# Patient Record
Sex: MALE | Race: WHITE
[De-identification: names, ages, dates, MRNs, and addresses within clinical notes are randomized per-mention and may not be internally consistent; named-entity substitution may affect disease eponyms.]

---

## 2018-08-10 ENCOUNTER — HOSPITAL ENCOUNTER (EMERGENCY)
Dept: HOSPITAL 59 - ER | Age: 23
Discharge: HOME | End: 2018-08-10
Payer: COMMERCIAL

## 2018-08-10 DIAGNOSIS — R06.00: ICD-10-CM

## 2018-08-10 DIAGNOSIS — N50.9: ICD-10-CM

## 2018-08-10 DIAGNOSIS — F41.1: Primary | ICD-10-CM

## 2018-08-10 LAB
BARBITURATE SCREEN URINE: NOT DETECTED
BENZODIAZEPINE SCREEN URINE: NOT DETECTED
CARDIOLIPIN IGM SER IA-ACNC: NOT DETECTED
CARDIOLIPIN IGM SER IA-ACNC: NOT DETECTED
METHADONE SCREEN URINE: NOT DETECTED
OPIATE SCREEN URINE: NOT DETECTED
PF4 HEPARIN CMPLX AB SER-ACNC: NOT DETECTED
PF4 HEPARIN CMPLX AB SER-ACNC: NOT DETECTED
PHENCYCLIDINE SCREEN URINE: NOT DETECTED
PROPOXYPHENE SCREEN URINE: NOT DETECTED
THC SCREEN URINE: NOT DETECTED
TRICYCLIC ANTIDEPRESSANT SCRN: NOT DETECTED

## 2018-08-10 PROCEDURE — 99283 EMERGENCY DEPT VISIT LOW MDM: CPT

## 2018-08-10 PROCEDURE — 80305 DRUG TEST PRSMV DIR OPT OBS: CPT

## 2018-08-10 NOTE — EMERGENCY DEPARTMENT RECORD
Anxiety





- General


Chief Complaint: Anxiety


Stated Complaint: ANXIETY


Time Seen by Provider: 08/10/18 16:42


Source: Patient


Mode of Arrival: Ambulatory


Limitations: No limitations





- History of Present Illness


Initial Comments: 





Pt from work with "anxiety" and hx of the same "twice a week or so".  Pt felt 

odd at work and "tried to calm down".  [place] heart racign and started to breath 

fast.  Tingles to lips and fingers.  No HA.  No Weakness.  Now feeling better 

on arrival. Never seen by a physician for these attacks.  Always able to 

resolve without medication. 


MD Complaint: Anxiety


Onset/Timin


-: Hour(s)


Symptoms: Dyspnea, Extremity numbness/tingling, Palpitations, Periorial numbness

/tingling


Place: Work


Previous History of Same: Yes


Severity: Moderate


Quality: Intermittant


Provoking factors: None known


Improves With: Rest


Worsens With: Nothing


Associated symptoms: Denies other symptoms





- Related Data


Home Medications: 


 Home Medications











 Medication  Instructions  Recorded  Confirmed  Last Taken


 


No Home Med [NO HOME MEDS]  08/10/18 08/10/18 Unknown











Allergies/Adverse Reactions: 


 Allergies











Allergy/AdvReac Type Severity Reaction Status Date / Time


 


No Known Drug Allergies Allergy   Verified 08/10/18 16:44














Review of Systems


Constitutional: Denies: Chills, Fever, Weakness


Eyes: Denies: Eye pain, Vision change


ENT: Denies: Congestion, Dental pain


Respiratory: Denies: Cough, Dyspnea


Cardiovascular: Reports: Palpitations.  Denies: Chest pain


Endocrine: Denies: Fatigue


Gastrointestinal: Reports: Nausea.  Denies: Abdominal pain, Vomiting


Musculoskeletal: Denies: Back pain, Joint swelling


Skin: Denies: Rash


Neurological: Reports: Tingling.  Denies: Abnormal gait, Headache, Seizure, 

Tremors, Weakness


Psychiatric: Reports: Anxiety.  Denies: Auditory hallucinations, Suicidal 

thoughts, Visual hallucinations





Physical Exam





- General


General Appearance: Alert, Oriented x3, Cooperative, Mild distress





- Eye


Eye exam: PERRL, EOMI.  negative: Nystagmus





- ENT


ENT exam: Mucous membranes moist, Normal external ear exam, Normal orophraynx, 

TM's normal bilaterally





- Neck


Neck exam: Normal inspection.  negative: Tenderness, Thyromegaly





- Respiratory


Respiratory exam: Normal lung sounds bilaterally.  negative: Rhonchi, Wheezes





- Cardiovascular


Cardiovascular Exam: Regular rate, Normal rhythm.  negative: Diastolic murmur, 

Irregular rhythm, Systolic murmur





- GI/Abdominal


GI/Abdominal exam: Soft, Normal bowel sounds.  negative: Tenderness





- Extremities


Extremities exam: Normal inspection.  negative: Calf tenderness, Tenderness





- Back


Back exam: Reports: Normal inspection





- Neurological


Neurological exam: Alert, Normal gait, Oriented X3





- Psychiatric


Psychiatric exam: Anxious, Normal affect, Normal mood





- Skin


Skin exam: Normal color





Course





- Reevaluation(s)


Reevaluation #1: 





08/10/18 16:47


Pt on montior with HR 90s and resp 10.  Sat 99%.


Refuses meds as he is driving home.  


Will monitor for a period in ED


Reevaluation #2: 





08/10/18 17:34


Pt agrees to requested UDS by employers.  Feels better and wants to leave. 





Disposition


Disposition: Discharge


Clinical Impression: 


 Anxiety reaction





Disposition: Home, Self-Care


Condition: (1) Good


Instructions:  Generalized Anxiety Disorder (ED)


Referrals: 


[facility] [Provider Group]


Forms:  Patient Portal Access





Quality





- Quality Measures


Quality Measures: N/A





- Blood Pressure Screening


Does Patient Have Any of the Following: No


Blood Pressure Classification: Pre-Hypertensive BP Reading


Systolic Measurement: 133


Diastolic Measurement: 88


Screening for High Blood Pressure: < Pre-Hypertensive BP, F/U Documented > [

]


Pre-Hypertensive Follow-up Interventions: Follow-up with rescreen every year.